# Patient Record
Sex: MALE | Race: WHITE | HISPANIC OR LATINO | Employment: OTHER | URBAN - METROPOLITAN AREA
[De-identification: names, ages, dates, MRNs, and addresses within clinical notes are randomized per-mention and may not be internally consistent; named-entity substitution may affect disease eponyms.]

---

## 2018-05-21 ENCOUNTER — HOSPITAL ENCOUNTER (EMERGENCY)
Facility: HOSPITAL | Age: 33
Discharge: HOME | End: 2018-05-21
Attending: EMERGENCY MEDICINE

## 2018-05-21 VITALS
RESPIRATION RATE: 16 BRPM | HEIGHT: 64 IN | SYSTOLIC BLOOD PRESSURE: 132 MMHG | HEART RATE: 76 BPM | DIASTOLIC BLOOD PRESSURE: 70 MMHG | BODY MASS INDEX: 24.92 KG/M2 | WEIGHT: 146 LBS | OXYGEN SATURATION: 98 % | TEMPERATURE: 98 F

## 2018-05-21 DIAGNOSIS — R10.9 ABDOMINAL PAIN, UNSPECIFIED ABDOMINAL LOCATION: Primary | ICD-10-CM

## 2018-05-21 DIAGNOSIS — K59.00 CONSTIPATION, UNSPECIFIED CONSTIPATION TYPE: ICD-10-CM

## 2018-05-21 LAB
ALBUMIN SERPL BCP-MCNC: 4.2 G/DL
ALP SERPL-CCNC: 91 U/L
ALT SERPL W/O P-5'-P-CCNC: 29 U/L
ANION GAP SERPL CALC-SCNC: 7 MMOL/L
AST SERPL-CCNC: 21 U/L
BASOPHILS # BLD AUTO: 0.04 K/UL
BASOPHILS NFR BLD: 0.8 %
BILIRUB SERPL-MCNC: 1.2 MG/DL
BILIRUB UR QL STRIP: NEGATIVE
BUN SERPL-MCNC: 15 MG/DL
CALCIUM SERPL-MCNC: 9.3 MG/DL
CHLORIDE SERPL-SCNC: 106 MMOL/L
CLARITY UR: CLEAR
CO2 SERPL-SCNC: 24 MMOL/L
COLOR UR: YELLOW
CREAT SERPL-MCNC: 0.9 MG/DL
DIFFERENTIAL METHOD: ABNORMAL
EOSINOPHIL # BLD AUTO: 0.1 K/UL
EOSINOPHIL NFR BLD: 1.7 %
ERYTHROCYTE [DISTWIDTH] IN BLOOD BY AUTOMATED COUNT: 12.4 %
EST. GFR  (AFRICAN AMERICAN): >60 ML/MIN/1.73 M^2
EST. GFR  (NON AFRICAN AMERICAN): >60 ML/MIN/1.73 M^2
GLUCOSE SERPL-MCNC: 81 MG/DL
GLUCOSE UR QL STRIP: NEGATIVE
HCT VFR BLD AUTO: 39.9 %
HGB BLD-MCNC: 13.3 G/DL
HGB UR QL STRIP: NEGATIVE
KETONES UR QL STRIP: NEGATIVE
LEUKOCYTE ESTERASE UR QL STRIP: NEGATIVE
LIPASE SERPL-CCNC: 34 U/L
LYMPHOCYTES # BLD AUTO: 1.6 K/UL
LYMPHOCYTES NFR BLD: 29.5 %
MCH RBC QN AUTO: 28.1 PG
MCHC RBC AUTO-ENTMCNC: 33.3 G/DL
MCV RBC AUTO: 84 FL
MONOCYTES # BLD AUTO: 0.4 K/UL
MONOCYTES NFR BLD: 7.7 %
NEUTROPHILS # BLD AUTO: 3.2 K/UL
NEUTROPHILS NFR BLD: 59.9 %
NITRITE UR QL STRIP: NEGATIVE
PH UR STRIP: 8 [PH] (ref 5–8)
PLATELET # BLD AUTO: 204 K/UL
PMV BLD AUTO: 10.4 FL
POTASSIUM SERPL-SCNC: 4.8 MMOL/L
PROT SERPL-MCNC: 7.4 G/DL
PROT UR QL STRIP: NEGATIVE
RBC # BLD AUTO: 4.74 M/UL
SODIUM SERPL-SCNC: 137 MMOL/L
SP GR UR STRIP: 1.01 (ref 1–1.03)
URN SPEC COLLECT METH UR: NORMAL
UROBILINOGEN UR STRIP-ACNC: NEGATIVE EU/DL
WBC # BLD AUTO: 5.32 K/UL

## 2018-05-21 PROCEDURE — 81003 URINALYSIS AUTO W/O SCOPE: CPT

## 2018-05-21 PROCEDURE — 25500020 PHARM REV CODE 255: Performed by: EMERGENCY MEDICINE

## 2018-05-21 PROCEDURE — 83690 ASSAY OF LIPASE: CPT

## 2018-05-21 PROCEDURE — 99284 EMERGENCY DEPT VISIT MOD MDM: CPT | Mod: 25

## 2018-05-21 PROCEDURE — 80053 COMPREHEN METABOLIC PANEL: CPT

## 2018-05-21 PROCEDURE — 85025 COMPLETE CBC W/AUTO DIFF WBC: CPT

## 2018-05-21 RX ORDER — MULTIVITAMIN
1 TABLET ORAL DAILY
COMMUNITY

## 2018-05-21 RX ADMIN — IOHEXOL 75 ML: 350 INJECTION, SOLUTION INTRAVENOUS at 01:05

## 2018-05-21 NOTE — ED PROVIDER NOTES
"Encounter Date: 5/21/2018    SCRIBE #1 NOTE: I, Tiera Lopez, am scribing for, and in the presence of,  Dr. Douglas. I have scribed the entire note.       History     Chief Complaint   Patient presents with    Flank Pain     c/o pain to right lower back that radiates to right lower abdomen and right upper leg. Also c/o burning with urination. History of kidney stone and of testicle surgery      Time seen by provider: 11:47 AM     This is a 32 y.o. male with PMHx of Gall stones who presents to the ED with a cc of constant right-sided flank pain which began x 4 days ago. The pain is rated an 8/10 in severity and radiates to his groin and knee area. Pt reports associated subjective fever, but denies hematuria, dysuria, chills, nausea, vomiting, or diarrhea. Symptoms are exacerbated by eating, so he has avoided eating for the past couple days. He reports no alleviating factors. Pt has a prior history of similar symptoms due to some sort of testicular problem 5 years ago. He states "they" had detected stones in his Gallbladder.  Pt reports no other medical complaints or injuries at this time.        The history is provided by the patient.     Review of patient's allergies indicates:  No Known Allergies  Past Medical History:   Diagnosis Date    Kidney stone      Past Surgical History:   Procedure Laterality Date    TESTICLE SURGERY       History reviewed. No pertinent family history.  Social History   Substance Use Topics    Smoking status: Never Smoker    Smokeless tobacco: Not on file    Alcohol use No     Review of Systems   Constitutional: Positive for fever (subjective).   HENT: Negative for sore throat.    Respiratory: Negative for shortness of breath.    Cardiovascular: Negative for chest pain.   Gastrointestinal: Negative for diarrhea, nausea and vomiting.   Genitourinary: Positive for flank pain (right). Negative for dysuria and hematuria.   Musculoskeletal: Negative for back pain.   Skin: Negative for " rash.   Neurological: Negative for weakness.   Hematological: Does not bruise/bleed easily.   All other systems reviewed and are negative.      Physical Exam     Initial Vitals [05/21/18 1124]   BP Pulse Resp Temp SpO2   (!) 156/94 66 18 98.8 °F (37.1 °C) 100 %      MAP       114.67         Physical Exam    Nursing note and vitals reviewed.  Constitutional: He appears well-developed and well-nourished. No distress.   Nontoxic in appearance.   HENT:   Head: Normocephalic and atraumatic.   Mouth/Throat: Oropharynx is clear and moist.   Eyes: Conjunctivae and EOM are normal. Pupils are equal, round, and reactive to light. No scleral icterus.   Neck: Normal range of motion. Neck supple. No JVD present.   Cardiovascular: Normal rate, regular rhythm and normal heart sounds. Exam reveals no gallop and no friction rub.    No murmur heard.  Pulmonary/Chest: Breath sounds normal. No stridor. No respiratory distress. He has no wheezes. He has no rhonchi. He has no rales. He exhibits no tenderness.   Abdominal: Bowel sounds are normal. He exhibits no distension and no mass. There is tenderness (mild) in the right lower quadrant. There is no rebound and no guarding.   Genitourinary:   Genitourinary Comments: No testicular tenderness. No enlargement or masses. No bulging with Valsalva. No evidence of torsion.    Musculoskeletal: Normal range of motion. He exhibits no edema or tenderness.   Lymphadenopathy:     He has no cervical adenopathy.   Neurological: He is alert and oriented to person, place, and time. No sensory deficit.   Skin: Skin is warm and dry. No rash noted. No erythema.   Psychiatric: He has a normal mood and affect. His behavior is normal.         ED Course   Procedures  Labs Reviewed   CBC W/ AUTO DIFFERENTIAL - Abnormal; Notable for the following:        Result Value    Hemoglobin 13.3 (*)     Hematocrit 39.9 (*)     All other components within normal limits   COMPREHENSIVE METABOLIC PANEL - Abnormal; Notable  for the following:     Total Bilirubin 1.2 (*)     Anion Gap 7 (*)     All other components within normal limits   LIPASE   URINALYSIS        Imaging Results          CT Abdomen Pelvis With Contrast (Final result)  Result time 05/21/18 13:39:06    Final result by Mikal Hooper MD (05/21/18 13:39:06)                 Impression:      1. No findings to suggest acute appendicitis as clinically questioned.  2. Subtle questionable urothelial enhancement involving the right renal collecting system, correlation with urinalysis recommended.  This may be artifactual, differential for this finding would include sequela of recently passed calculus or possibly infectious or inflammatory process, correlation is needed.  3. Additional findings above.      Electronically signed by: Mikal Hooper MD  Date:    05/21/2018  Time:    13:39             Narrative:    EXAMINATION:  CT ABDOMEN PELVIS WITH CONTRAST    CLINICAL HISTORY:  RLQ pain, appendicitis suspected;    TECHNIQUE:  Low dose axial images, sagittal and coronal reformations were obtained from the lung bases to the pubic symphysis following the IV administration of 75 mL of Omnipaque 350 .  Oral contrast was not given.    COMPARISON:  None.    FINDINGS:  Images of the lower thorax are remarkable for minimal dependent atelectasis.    The liver, spleen, pancreas, gallbladder and adrenal glands are grossly unremarkable.  There is no biliary dilation or ascites.  The portal vein, splenic vein, SMV, celiac axis and SMA all are patent.    No significant abdominal lymphadenopathy.    The kidneys enhance symmetrically without hydronephrosis or nephrolithiasis.  The bilateral ureters are without calculi seen.  There may be subtle urothelial thickening of the right renal collecting system.  The urinary bladder is grossly unremarkable.  The prostate is not enlarged.    There is moderate stool in the colon without wall thickening.  The terminal ileum is unremarkable.  The small  bowel is grossly unremarkable.  The appendix is unremarkable without surrounding inflammation.  No focal organized fluid collection.  No significant free fluid in the pelvis.    Degenerative changes are noted of the lumbar spine without focal osseous destructive process.  No significant inguinal lymphadenopathy.  There are pars defects at L5 with minimal grade 1 anterolisthesis of L5 on S1.                                   Medical Decision Making:   Initial Assessment:   This is a 32 y.o. male with PMHx of Gall stones who presents to the ED with a cc of constant right-sided flank pain which began x 4 days ago.   Differential Diagnosis:   Musculoskeletal causes, kidney stone, pyelonephritis, shingles, and intra-abdominal causes such as diverticulitis and appendicitis.    Clinical Tests:   Lab Tests: Ordered and Reviewed  Radiological Study: Ordered and Reviewed  ED Management:  2:05 PM: No evidence of appendicitis or kidney stones.  2:36 PM  Patient doing well, non-toxic in appearance. No evidence of obstructive or inflammatory changes on today's CT scan. Normal testicular exam. No evidence of appendicitis. Will discharge to home and recommend close follow-up with PCP    Additional MDM:   Abdominal Pain:   CT scan done: Abd/Pelvis /w contrast. The CT is normal. The lab was independently reviewed and is normal. Disposition: Discharged. The patient's condition was felt to be stable.            Attending Attestation:           Physician Attestation for Scribe:  Physician Attestation Statement for Scribe #1: I, Tristan Douglas, reviewed documentation, as scribed by Tiera Lopez in my presence, and it is both accurate and complete.                    Clinical Impression:     1. Abdominal pain, unspecified abdominal location    2. Constipation, unspecified constipation type          Disposition:   Disposition: Discharged    Scribe attestation:                    Tristan Douglas MD  05/21/18 [id number]

## 2018-05-21 NOTE — ED NOTES
Dr Douglas to room to discuss plan of care and follow up care via . Jaelyn SLATER   . Written information given per patients request for free hIV and STD examine clinic ..

## 2018-05-21 NOTE — ED TRIAGE NOTES
Patient is a 32 year old  complaining of right lower abdominal pain that radiates to right flank area, and right groin area.  No dysuria.  No injury.

## 2018-05-21 NOTE — ED NOTES
APPEARANCE: Alert, oriented and in acute distress.  CARDIAC: Normal rate and rhythm, no murmur heard.   PERIPHERAL VASCULAR: peripheral pulses present. Normal cap refill. No edema. Warm to touch.    RESPIRATORY:Normal rate and effort, breath sounds clear bilaterally throughout chest. Respirations are equal and unlabored no obvious signs of distress.  :  No dysuria, right groin and testicular pain.  GASTRO: soft, bowel sounds normal, tenderness to right lower abdomen radiating to right flank and groin and testicular area, no abdominal distention.  MUSC: Full ROM. No bony tenderness or soft tissue tenderness. No obvious deformity.  SKIN: Skin is warm and dry, normal skin turgor, mucous membranes moist.  NEURO: 5/5 strength major flexors/extensors bilaterally. Sensory intact to light touch bilaterally. [place] coma scale: eyes open spontaneously-4, oriented & converses-5, obeys commands-6. No neurological abnormalities.   MENTAL STATUS: awake, alert and aware of environment.  GENITALIA: Normal external genitalia.

## 2018-05-21 NOTE — ED NOTES
Assumed care of 32 year old male  Complaint of right flank pain radiating into RLQ  of abdomen into groin down to midthigh. Alert and verbally responsive ,  abs . No distress noted